# Patient Record
(demographics unavailable — no encounter records)

---

## 2025-01-03 NOTE — ASSESSMENT
[FreeTextEntry1] : I discussed the differential diagnosis of chronic cough with the patient and .  DD include but limited to Bronchial asthma, GERD, and postnasal drip.  The patient reported normal CXR X3.  She will obtain the cd rom for my evaluation.  The patient has a family history of bronchial asthma.  There are trigger factors such as the air pollution related to the nearby construction, and feather pillows.  There is evidence of GERD and postnasal drip.  The patient initially responded to systemic steroids and bronchodilator.  My opinion that the patient has underlying bronchial asthma triggered by air pollution and possible animal antigen.   I started the patient on Symbicort 2 puffs bud and to continue on albuterol as needed basis.  She is to control the triggering factors but replacing the pillow with synthetic one and have an air purifier in the bedroom.  PFT and FeNO.  Once stable will evaluate for weaning  I informed the patient with the result of the PFT with increase in FEV post-bronchodilator >200ml and high FeNO all consistent with bronchial asthma

## 2025-01-03 NOTE — REVIEW OF SYSTEMS
[Cough] : cough [Wheezing] : wheezing [Negative] : Endocrine [Hemoptysis] : no hemoptysis [Chest Tightness] : no chest tightness [Frequent URIs] : no frequent URIs [Sputum] : no sputum [Dyspnea] : no dyspnea [Pleuritic Pain] : no pleuritic pain [A.M. Dry Mouth] : no a.m. dry mouth [SOB on Exertion] : no sob on exertion

## 2025-01-03 NOTE — HISTORY OF PRESENT ILLNESS
[Never] : never [TextBox_4] : 47 yr old female with PMH of cough since November.  Her condition started by having sensation of squeeze in chest and inability to take deep breath.  The re was no triggering factors.  She has a feather pillows for 3 years.  There was construction in the nearby apartment and there is dust in air.  She was seen in urgent care few times with no response.  She had 3 CXRs and were negative.  She was initially given azithromycin with no response.  She was prescribed prednisone and albuterol and improved on them.  She had tremors from the albuterol.  He sometimes wakes up with the chest tightens from lseep.  She starts coughing when she laughs or talks for sometimes.  Sometimes she hears a wheeze [ESS] : 0